# Patient Record
Sex: FEMALE | Race: WHITE | NOT HISPANIC OR LATINO
[De-identification: names, ages, dates, MRNs, and addresses within clinical notes are randomized per-mention and may not be internally consistent; named-entity substitution may affect disease eponyms.]

---

## 2017-05-01 ENCOUNTER — TRANSCRIPTION ENCOUNTER (OUTPATIENT)
Age: 37
End: 2017-05-01

## 2017-05-04 ENCOUNTER — EMERGENCY (EMERGENCY)
Facility: HOSPITAL | Age: 37
LOS: 0 days | Discharge: HOME | End: 2017-05-04
Attending: EMERGENCY MEDICINE | Admitting: EMERGENCY MEDICINE
Payer: COMMERCIAL

## 2017-05-04 VITALS — HEIGHT: 65 IN | WEIGHT: 139.99 LBS

## 2017-05-04 VITALS
TEMPERATURE: 98 F | RESPIRATION RATE: 18 BRPM | HEART RATE: 80 BPM | SYSTOLIC BLOOD PRESSURE: 111 MMHG | OXYGEN SATURATION: 97 % | DIASTOLIC BLOOD PRESSURE: 83 MMHG

## 2017-05-04 DIAGNOSIS — S09.90XA UNSPECIFIED INJURY OF HEAD, INITIAL ENCOUNTER: ICD-10-CM

## 2017-05-04 DIAGNOSIS — M79.604 PAIN IN RIGHT LEG: ICD-10-CM
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
## 2017-05-04 DIAGNOSIS — Z98.891 HISTORY OF UTERINE SCAR FROM PREVIOUS SURGERY: Chronic | ICD-10-CM
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
## 2017-05-04 PROCEDURE — 93971 EXTREMITY STUDY: CPT | Mod: 26,RT

## 2017-05-04 PROCEDURE — 99284 EMERGENCY DEPT VISIT MOD MDM: CPT | Mod: 25

## 2017-05-04 PROCEDURE — 70450 CT HEAD/BRAIN W/O DYE: CPT | Mod: 26

## 2017-05-04 RX ORDER — DIPHENHYDRAMINE HCL 50 MG
25 CAPSULE ORAL ONCE
Qty: 0 | Refills: 0 | Status: COMPLETED | OUTPATIENT
Start: 2017-05-04 | End: 2017-05-04

## 2017-05-04 RX ORDER — KETOROLAC TROMETHAMINE 30 MG/ML
30 SYRINGE (ML) INJECTION ONCE
Qty: 0 | Refills: 0 | Status: DISCONTINUED | OUTPATIENT
Start: 2017-05-04 | End: 2017-05-04

## 2017-05-04 RX ORDER — METOCLOPRAMIDE HCL 10 MG
10 TABLET ORAL ONCE
Qty: 0 | Refills: 0 | Status: COMPLETED | OUTPATIENT
Start: 2017-05-04 | End: 2017-05-04

## 2017-05-04 RX ORDER — SODIUM CHLORIDE 9 MG/ML
1000 INJECTION INTRAMUSCULAR; INTRAVENOUS; SUBCUTANEOUS ONCE
Qty: 0 | Refills: 0 | Status: COMPLETED | OUTPATIENT
Start: 2017-05-04 | End: 2017-05-04

## 2017-05-04 RX ADMIN — Medication 30 MILLIGRAM(S): at 06:35

## 2017-05-04 RX ADMIN — Medication 25 MILLIGRAM(S): at 06:35

## 2017-05-04 RX ADMIN — Medication 10 MILLIGRAM(S): at 06:35

## 2017-05-04 RX ADMIN — Medication 25 MILLIGRAM(S): at 07:00

## 2017-05-04 RX ADMIN — SODIUM CHLORIDE 1000 MILLILITER(S): 9 INJECTION INTRAMUSCULAR; INTRAVENOUS; SUBCUTANEOUS at 06:43
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
## 2017-05-04 NOTE — ED ADULT NURSE NOTE - CHPI ED SYMPTOMS NEG
no change in level of consciousness/no confusion/no dizziness/no blurred vision/no loss of consciousness

## 2017-05-04 NOTE — ED PROVIDER NOTE - DETAILS:
Delmis Woo MD - The scribe's documentation has been prepared under my direction and personally reviewed by me in its entirety. I confirm that the note above accurately reflects all work, treatment, procedures, and medical decision making performed by me.
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
## 2017-05-04 NOTE — ED PROVIDER NOTE - MUSCULOSKELETAL, MLM
Spine appears normal, range of motion is not limited, no muscle or joint tenderness Spine appears normal, range of motion is not limited, no muscle or joint tenderness.  Mild tenderness to palpation behind right knee without specific point tenderness, neg Homans sign, no swelling or color change

## 2018-05-23 ENCOUNTER — EMERGENCY (EMERGENCY)
Facility: HOSPITAL | Age: 38
LOS: 0 days | Discharge: HOME | End: 2018-05-23
Attending: EMERGENCY MEDICINE | Admitting: EMERGENCY MEDICINE
Payer: COMMERCIAL

## 2018-05-23 ENCOUNTER — TRANSCRIPTION ENCOUNTER (OUTPATIENT)
Age: 38
End: 2018-05-23

## 2018-05-23 VITALS
HEART RATE: 78 BPM | SYSTOLIC BLOOD PRESSURE: 126 MMHG | OXYGEN SATURATION: 100 % | DIASTOLIC BLOOD PRESSURE: 85 MMHG | WEIGHT: 119.93 LBS | TEMPERATURE: 98 F | HEIGHT: 65 IN | RESPIRATION RATE: 18 BRPM

## 2018-05-23 DIAGNOSIS — Z98.891 HISTORY OF UTERINE SCAR FROM PREVIOUS SURGERY: Chronic | ICD-10-CM

## 2018-05-23 LAB
ALBUMIN SERPL ELPH-MCNC: 4 G/DL — SIGNIFICANT CHANGE UP (ref 3.3–5)
ALP SERPL-CCNC: 56 U/L — SIGNIFICANT CHANGE UP (ref 40–120)
ALT FLD-CCNC: 17 U/L — SIGNIFICANT CHANGE UP (ref 12–78)
ANION GAP SERPL CALC-SCNC: 7 MMOL/L — SIGNIFICANT CHANGE UP (ref 5–17)
AST SERPL-CCNC: 11 U/L — LOW (ref 15–37)
BASOPHILS # BLD AUTO: 0.03 K/UL — SIGNIFICANT CHANGE UP (ref 0–0.2)
BASOPHILS NFR BLD AUTO: 0.5 % — SIGNIFICANT CHANGE UP (ref 0–2)
BILIRUB SERPL-MCNC: 0.4 MG/DL — SIGNIFICANT CHANGE UP (ref 0.2–1.2)
BUN SERPL-MCNC: 9 MG/DL — SIGNIFICANT CHANGE UP (ref 7–23)
CALCIUM SERPL-MCNC: 8.8 MG/DL — SIGNIFICANT CHANGE UP (ref 8.5–10.1)
CHLORIDE SERPL-SCNC: 108 MMOL/L — SIGNIFICANT CHANGE UP (ref 96–108)
CK SERPL-CCNC: 52 U/L — SIGNIFICANT CHANGE UP (ref 26–192)
CO2 SERPL-SCNC: 23 MMOL/L — SIGNIFICANT CHANGE UP (ref 22–31)
CREAT SERPL-MCNC: 0.66 MG/DL — SIGNIFICANT CHANGE UP (ref 0.5–1.3)
EOSINOPHIL # BLD AUTO: 0.01 K/UL — SIGNIFICANT CHANGE UP (ref 0–0.5)
EOSINOPHIL NFR BLD AUTO: 0.2 % — SIGNIFICANT CHANGE UP (ref 0–6)
GLUCOSE SERPL-MCNC: 83 MG/DL — SIGNIFICANT CHANGE UP (ref 70–99)
HCT VFR BLD CALC: 37.8 % — SIGNIFICANT CHANGE UP (ref 34.5–45)
HGB BLD-MCNC: 12.7 G/DL — SIGNIFICANT CHANGE UP (ref 11.5–15.5)
IMM GRANULOCYTES NFR BLD AUTO: 0.2 % — SIGNIFICANT CHANGE UP (ref 0–1.5)
LYMPHOCYTES # BLD AUTO: 1.3 K/UL — SIGNIFICANT CHANGE UP (ref 1–3.3)
LYMPHOCYTES # BLD AUTO: 22.4 % — SIGNIFICANT CHANGE UP (ref 13–44)
MCHC RBC-ENTMCNC: 29.6 PG — SIGNIFICANT CHANGE UP (ref 27–34)
MCHC RBC-ENTMCNC: 33.6 GM/DL — SIGNIFICANT CHANGE UP (ref 32–36)
MCV RBC AUTO: 88.1 FL — SIGNIFICANT CHANGE UP (ref 80–100)
MONOCYTES # BLD AUTO: 0.48 K/UL — SIGNIFICANT CHANGE UP (ref 0–0.9)
MONOCYTES NFR BLD AUTO: 8.3 % — SIGNIFICANT CHANGE UP (ref 2–14)
NEUTROPHILS # BLD AUTO: 3.97 K/UL — SIGNIFICANT CHANGE UP (ref 1.8–7.4)
NEUTROPHILS NFR BLD AUTO: 68.4 % — SIGNIFICANT CHANGE UP (ref 43–77)
NRBC # BLD: 0 /100 WBCS — SIGNIFICANT CHANGE UP (ref 0–0)
PLATELET # BLD AUTO: 246 K/UL — SIGNIFICANT CHANGE UP (ref 150–400)
POTASSIUM SERPL-MCNC: 3.6 MMOL/L — SIGNIFICANT CHANGE UP (ref 3.5–5.3)
POTASSIUM SERPL-SCNC: 3.6 MMOL/L — SIGNIFICANT CHANGE UP (ref 3.5–5.3)
PROT SERPL-MCNC: 7.7 GM/DL — SIGNIFICANT CHANGE UP (ref 6–8.3)
RBC # BLD: 4.29 M/UL — SIGNIFICANT CHANGE UP (ref 3.8–5.2)
RBC # FLD: 14.4 % — SIGNIFICANT CHANGE UP (ref 10.3–14.5)
SODIUM SERPL-SCNC: 138 MMOL/L — SIGNIFICANT CHANGE UP (ref 135–145)
TROPONIN I SERPL-MCNC: <0.015 NG/ML — SIGNIFICANT CHANGE UP (ref 0.01–0.04)
TROPONIN I SERPL-MCNC: <0.015 NG/ML — SIGNIFICANT CHANGE UP (ref 0.01–0.04)
WBC # BLD: 5.8 K/UL — SIGNIFICANT CHANGE UP (ref 3.8–10.5)
WBC # FLD AUTO: 5.8 K/UL — SIGNIFICANT CHANGE UP (ref 3.8–10.5)

## 2018-05-23 PROCEDURE — 99285 EMERGENCY DEPT VISIT HI MDM: CPT

## 2018-05-23 PROCEDURE — 83020 HEMOGLOBIN ELECTROPHORESIS: CPT | Mod: 26

## 2018-05-23 PROCEDURE — 93010 ELECTROCARDIOGRAM REPORT: CPT

## 2018-05-23 RX ORDER — ACETAMINOPHEN 500 MG
650 TABLET ORAL ONCE
Qty: 0 | Refills: 0 | Status: COMPLETED | OUTPATIENT
Start: 2018-05-23 | End: 2018-05-23

## 2018-05-23 RX ORDER — IBUPROFEN 200 MG
600 TABLET ORAL ONCE
Qty: 0 | Refills: 0 | Status: COMPLETED | OUTPATIENT
Start: 2018-05-23 | End: 2018-05-23

## 2018-05-23 RX ORDER — SODIUM CHLORIDE 9 MG/ML
3 INJECTION INTRAMUSCULAR; INTRAVENOUS; SUBCUTANEOUS ONCE
Qty: 0 | Refills: 0 | Status: DISCONTINUED | OUTPATIENT
Start: 2018-05-23 | End: 2018-05-23

## 2018-05-23 RX ADMIN — Medication 600 MILLIGRAM(S): at 15:05

## 2018-05-23 RX ADMIN — Medication 650 MILLIGRAM(S): at 18:14

## 2018-05-23 NOTE — ED STATDOCS - PROGRESS NOTE DETAILS
38 yr. old female PMH: Iron deficient Anemia presents to ED with fatigue and palpitations. Seen at Urgent Care today and sent to ED for further evaluation. No chest pain or difficulty jbreathing. Seen and examined by attending in Intake. Plan: IV, Labs, EKG and Will F/U with result  and re evaluate. Coy JAIN 38 yr. old female PMH: Iron deficient Anemia presents to ED with fatigue and palpitations. Seen at Urgent Care today and sent to ED for further evaluation. No chest pain or difficulty breathing. Seen and examined by attending in Intake. Plan: IV, Labs, EKG and Will F/U with result  and re evaluate. Coy NP

## 2018-05-23 NOTE — ED ADULT TRIAGE NOTE - CHIEF COMPLAINT QUOTE
Pt reports hx of anemia with c/o fatigue and weakness. Reports episode of palpitations at MD office with no c/o palpitations at this time. Pt reports hx of anemia with c/o fatigue and weakness. Reports episode of palpitations at MD office with no c/o palpitations at this time. Denies any pain at this time.

## 2018-05-23 NOTE — ED ADULT NURSE NOTE - CHIEF COMPLAINT QUOTE
Pt reports hx of anemia with c/o fatigue and weakness. Reports episode of palpitations at MD office with no c/o palpitations at this time.

## 2018-05-23 NOTE — ED STATDOCS - OBJECTIVE STATEMENT
39 y/o F with PMHx of iron-deficiency anemia diagnosed a few years ago presents to the ED c/o fatigue and palpitations. Pt states that she started feeling palpitations. Pt started to feel weak. Pt went to an urgent care and had an EKG done which was abnormal. Pt was then told to come to the ED for evaluation. Palpitations have resolved. Pt has some chest tightness at this time. Pt has been taking iron supplements. Pt had blood work done a month ago with Hb of 11 and ferritin of 3. LNMP 3 weeks ago. Pt states that she has heavier menstrual periods after her . PSHx of  a few years. FMHx of cardiac issues in Pt's mother after the age of 50. Non smoker. No recent travel. Dr. Kohler, hematologist.   Pt also c/o R breast and underarm tenderness starting two weeks ago. No chance of pregnancy.

## 2018-05-24 DIAGNOSIS — R00.2 PALPITATIONS: ICD-10-CM

## 2018-05-24 DIAGNOSIS — R53.83 OTHER FATIGUE: ICD-10-CM

## 2018-05-24 DIAGNOSIS — D64.9 ANEMIA, UNSPECIFIED: ICD-10-CM

## 2018-05-25 ENCOUNTER — NON-APPOINTMENT (OUTPATIENT)
Age: 38
End: 2018-05-25

## 2018-05-25 ENCOUNTER — APPOINTMENT (OUTPATIENT)
Dept: ELECTROPHYSIOLOGY | Facility: CLINIC | Age: 38
End: 2018-05-25
Payer: COMMERCIAL

## 2018-05-25 VITALS
SYSTOLIC BLOOD PRESSURE: 128 MMHG | RESPIRATION RATE: 100 BRPM | WEIGHT: 144 LBS | HEIGHT: 65 IN | HEART RATE: 81 BPM | DIASTOLIC BLOOD PRESSURE: 80 MMHG | BODY MASS INDEX: 23.99 KG/M2

## 2018-05-25 DIAGNOSIS — Z78.9 OTHER SPECIFIED HEALTH STATUS: ICD-10-CM

## 2018-05-25 DIAGNOSIS — Z82.49 FAMILY HISTORY OF ISCHEMIC HEART DISEASE AND OTHER DISEASES OF THE CIRCULATORY SYSTEM: ICD-10-CM

## 2018-05-25 DIAGNOSIS — Z83.49 FAMILY HISTORY OF OTHER ENDOCRINE, NUTRITIONAL AND METABOLIC DISEASES: ICD-10-CM

## 2018-05-25 PROCEDURE — 93000 ELECTROCARDIOGRAM COMPLETE: CPT

## 2018-05-25 PROCEDURE — 99205 OFFICE O/P NEW HI 60 MIN: CPT

## 2018-05-25 PROCEDURE — 99214 OFFICE O/P EST MOD 30 MIN: CPT

## 2018-05-25 RX ORDER — FERROUS SULFATE 325(65) MG
325 (65 FE) TABLET ORAL
Refills: 0 | Status: ACTIVE | COMMUNITY

## 2018-05-26 LAB
HEMOGLOBIN INTERPRETATION: SIGNIFICANT CHANGE UP
HGB A MFR BLD: 97.5 % — SIGNIFICANT CHANGE UP (ref 95.8–98)
HGB A2 MFR BLD: 2.5 % — SIGNIFICANT CHANGE UP (ref 2–3.2)

## 2018-06-22 ENCOUNTER — APPOINTMENT (OUTPATIENT)
Dept: ELECTROPHYSIOLOGY | Facility: CLINIC | Age: 38
End: 2018-06-22
Payer: COMMERCIAL

## 2018-06-22 VITALS
BODY MASS INDEX: 23.14 KG/M2 | HEART RATE: 71 BPM | SYSTOLIC BLOOD PRESSURE: 112 MMHG | HEIGHT: 66 IN | DIASTOLIC BLOOD PRESSURE: 75 MMHG | WEIGHT: 144 LBS

## 2018-06-22 DIAGNOSIS — R00.2 PALPITATIONS: ICD-10-CM

## 2018-06-22 DIAGNOSIS — R06.09 OTHER FORMS OF DYSPNEA: ICD-10-CM

## 2018-06-22 PROCEDURE — 99214 OFFICE O/P EST MOD 30 MIN: CPT

## 2018-06-22 RX ORDER — ALUMINUM HYDROXIDE AND MAGNESIUM HYDROXIDE 200; 200 MG/5ML; MG/5ML
200-200 SUSPENSION ORAL
Refills: 0 | Status: ACTIVE | COMMUNITY

## 2018-06-28 ENCOUNTER — APPOINTMENT (OUTPATIENT)
Dept: ELECTROPHYSIOLOGY | Facility: CLINIC | Age: 38
End: 2018-06-28

## 2019-02-28 ENCOUNTER — TRANSCRIPTION ENCOUNTER (OUTPATIENT)
Age: 39
End: 2019-02-28

## 2020-01-26 ENCOUNTER — TRANSCRIPTION ENCOUNTER (OUTPATIENT)
Age: 40
End: 2020-01-26

## 2020-10-30 NOTE — ED PROVIDER NOTE - NS ED MD DISPO DISCHARGE
I have personally seen and examined this patient.  I have fully participated in the care of this patient. I have reviewed all pertinent clinical information, including history, physical exam, plan and the Resident’s note and agree except as noted. Home

## 2023-01-19 NOTE — ED PROVIDER NOTE - NS ED MD SCRIBE ATTENDING SCRIBE SECTIONS
PHYSICAL EXAM/REVIEW OF SYSTEMS/PAST MEDICAL/SURGICAL/SOCIAL HISTORY/HISTORY OF PRESENT ILLNESS/RESULTS/PROGRESS NOTE/DISPOSITION/VITAL SIGNS( Pullset) 14

## 2023-05-23 NOTE — ED ADULT NURSE NOTE - NS ED NOTE ABUSE RESPONSE YN
no
woke up at 7am feeling fine, brushed teeth,, was about to get dressed , lost balance , fell onto the dresser, sudden onset of dizziness and nausea, denies any h/o vertigo. fingerstick- 100

## 2023-08-31 ENCOUNTER — OFFICE (OUTPATIENT)
Dept: URBAN - METROPOLITAN AREA CLINIC 100 | Facility: CLINIC | Age: 43
Setting detail: OPHTHALMOLOGY
End: 2023-08-31
Payer: COMMERCIAL

## 2023-08-31 DIAGNOSIS — T15.12XA: ICD-10-CM

## 2023-08-31 PROBLEM — H40.013 GLAUCOMA SUSPECT, LOW RISK 1-2 FACTORS; BOTH EYES: Status: ACTIVE | Noted: 2023-08-31

## 2023-08-31 PROCEDURE — 92002 INTRM OPH EXAM NEW PATIENT: CPT | Performed by: OPHTHALMOLOGY

## 2023-08-31 ASSESSMENT — SPHEQUIV_DERIVED
OD_SPHEQUIV: -1.5
OS_SPHEQUIV: -1.25

## 2023-08-31 ASSESSMENT — CONFRONTATIONAL VISUAL FIELD TEST (CVF)
OS_FINDINGS: FULL
OD_FINDINGS: FULL

## 2023-08-31 ASSESSMENT — VISUAL ACUITY
OD_BCVA: 20/20
OS_BCVA: 20/20-2

## 2023-08-31 ASSESSMENT — REFRACTION_AUTOREFRACTION
OD_SPHERE: -1.00
OS_SPHERE: -1.00
OS_CYLINDER: -0.50
OD_CYLINDER: -1.00
OS_AXIS: 076
OD_AXIS: 104

## 2023-08-31 ASSESSMENT — TONOMETRY
OS_IOP_MMHG: 20
OD_IOP_MMHG: 20

## 2023-09-14 ENCOUNTER — OFFICE (OUTPATIENT)
Dept: URBAN - METROPOLITAN AREA CLINIC 100 | Facility: CLINIC | Age: 43
Setting detail: OPHTHALMOLOGY
End: 2023-09-14
Payer: COMMERCIAL

## 2023-09-14 DIAGNOSIS — H40.013: ICD-10-CM

## 2023-09-14 PROCEDURE — 76514 ECHO EXAM OF EYE THICKNESS: CPT | Performed by: OPHTHALMOLOGY

## 2023-09-14 PROCEDURE — 92083 EXTENDED VISUAL FIELD XM: CPT | Performed by: OPHTHALMOLOGY

## 2023-09-14 PROCEDURE — 92250 FUNDUS PHOTOGRAPHY W/I&R: CPT | Performed by: OPHTHALMOLOGY

## 2023-09-14 PROCEDURE — 92014 COMPRE OPH EXAM EST PT 1/>: CPT | Performed by: OPHTHALMOLOGY

## 2023-09-14 ASSESSMENT — KERATOMETRY
OD_K1POWER_DIOPTERS: 43.00
OS_AXISANGLE_DEGREES: 125
OD_K2POWER_DIOPTERS: 43.00
OS_K1POWER_DIOPTERS: 43.00
OS_K2POWER_DIOPTERS: 43.25
OD_AXISANGLE_DEGREES: 090

## 2023-09-14 ASSESSMENT — TONOMETRY
OD_IOP_MMHG: 18
OS_IOP_MMHG: 18

## 2023-09-14 ASSESSMENT — SPHEQUIV_DERIVED
OS_SPHEQUIV: -1.75
OD_SPHEQUIV: -1.375
OD_SPHEQUIV: -2.125
OS_SPHEQUIV: -1.25

## 2023-09-14 ASSESSMENT — PACHYMETRY
OD_CT_UM: 555
OS_CT_UM: 561
OS_CT_CORRECTION: -1
OD_CT_CORRECTION: -1

## 2023-09-14 ASSESSMENT — AXIALLENGTH_DERIVED
OS_AL: 24.4409
OD_AL: 24.3339
OS_AL: 24.2336
OD_AL: 24.6488

## 2023-09-14 ASSESSMENT — REFRACTION_MANIFEST
OD_ADD: +1.50
OD_SPHERE: -1.75
OS_VA1: 20/20
OS_AXIS: 080
OD_AXIS: 110
OS_ADD: +1.50
OS_SPHERE: -1.50
OD_VA1: 20/20
OS_CYLINDER: -0.50
OD_CYLINDER: -0.75

## 2023-09-14 ASSESSMENT — REFRACTION_AUTOREFRACTION
OD_AXIS: 109
OS_CYLINDER: -0.50
OS_SPHERE: -1.00
OD_CYLINDER: -0.75
OS_AXIS: 078
OD_SPHERE: -1.00

## 2023-09-14 ASSESSMENT — REFRACTION_CURRENTRX
OS_OVR_VA: 20/
OD_CYLINDER: SPHR
OS_SPHERE: -1.25
OD_SPHERE: -1.50
OD_VPRISM_DIRECTION: SV
OS_VPRISM_DIRECTION: SV
OD_OVR_VA: 20/
OS_CYLINDER: SPHR

## 2023-09-14 ASSESSMENT — CONFRONTATIONAL VISUAL FIELD TEST (CVF)
OD_FINDINGS: FULL
OS_FINDINGS: FULL

## 2023-09-14 ASSESSMENT — VISUAL ACUITY
OD_BCVA: 20/20
OS_BCVA: 20/20

## 2025-01-16 ENCOUNTER — OFFICE (OUTPATIENT)
Dept: URBAN - METROPOLITAN AREA CLINIC 100 | Facility: CLINIC | Age: 45
Setting detail: OPHTHALMOLOGY
End: 2025-01-16
Payer: COMMERCIAL

## 2025-01-16 PROCEDURE — SCCOS COSMETIC CONSULTATION: Performed by: OPHTHALMOLOGY

## 2025-01-16 ASSESSMENT — VISUAL ACUITY
OD_BCVA: 20/40
OS_BCVA: 20/50-2

## 2025-01-16 ASSESSMENT — REFRACTION_AUTOREFRACTION
OS_AXIS: 078
OD_CYLINDER: -0.75
OS_SPHERE: -1.00
OD_AXIS: 109
OD_SPHERE: -1.00
OS_CYLINDER: -0.50

## 2025-01-16 ASSESSMENT — KERATOMETRY
OS_AXISANGLE_DEGREES: 125
OD_K2POWER_DIOPTERS: 43.00
OS_K1POWER_DIOPTERS: 43.00
OD_K1POWER_DIOPTERS: 43.00
OS_K2POWER_DIOPTERS: 43.25
OD_AXISANGLE_DEGREES: 090

## 2025-01-16 ASSESSMENT — CONFRONTATIONAL VISUAL FIELD TEST (CVF)
OD_FINDINGS: FULL
OS_FINDINGS: FULL

## 2025-06-02 ENCOUNTER — OFFICE (OUTPATIENT)
Dept: URBAN - METROPOLITAN AREA CLINIC 12 | Facility: CLINIC | Age: 45
Setting detail: OPHTHALMOLOGY
End: 2025-06-02
Payer: COMMERCIAL

## 2025-06-02 DIAGNOSIS — S05.02XA: ICD-10-CM

## 2025-06-02 PROCEDURE — 92014 COMPRE OPH EXAM EST PT 1/>: CPT | Performed by: OPTOMETRIST

## 2025-06-02 ASSESSMENT — REFRACTION_AUTOREFRACTION
OS_SPHERE: -1.00
OD_AXIS: 109
OD_CYLINDER: -0.75
OS_CYLINDER: -0.50
OS_AXIS: 078
OD_SPHERE: -1.00

## 2025-06-02 ASSESSMENT — PACHYMETRY
OD_CT_CORRECTION: -1
OS_CT_CORRECTION: -1
OS_CT_UM: 561
OD_CT_UM: 555

## 2025-06-02 ASSESSMENT — VISUAL ACUITY
OD_BCVA: 20/20-1
OS_BCVA: 20/20-2

## 2025-06-02 ASSESSMENT — KERATOMETRY
OD_K1POWER_DIOPTERS: 43.00
OS_K2POWER_DIOPTERS: 43.25
OS_AXISANGLE_DEGREES: 125
OD_AXISANGLE_DEGREES: 090
OS_K1POWER_DIOPTERS: 43.00
OD_K2POWER_DIOPTERS: 43.00

## 2025-06-02 ASSESSMENT — CORNEAL TRAUMA - ABRASION: OS_ABRASION: PRESENT

## 2025-06-02 ASSESSMENT — TONOMETRY
OS_IOP_MMHG: 17
OD_IOP_MMHG: 16

## 2025-06-02 ASSESSMENT — CONFRONTATIONAL VISUAL FIELD TEST (CVF)
OS_FINDINGS: FULL
OD_FINDINGS: FULL

## 2025-06-21 ENCOUNTER — OFFICE (OUTPATIENT)
Dept: URBAN - METROPOLITAN AREA CLINIC 12 | Facility: CLINIC | Age: 45
Setting detail: OPHTHALMOLOGY
End: 2025-06-21
Payer: COMMERCIAL

## 2025-06-21 DIAGNOSIS — H16.223: ICD-10-CM

## 2025-06-21 PROBLEM — S05.02XA CORNEAL ABRASION; INITIAL ENCOUNTER: Status: RESOLVED | Noted: 2025-06-02 | Resolved: 2025-06-21

## 2025-06-21 PROBLEM — G51.4 MYOKIYMIA: Status: ACTIVE | Noted: 2025-06-21

## 2025-06-21 PROCEDURE — 92012 INTRM OPH EXAM EST PATIENT: CPT | Performed by: OPTOMETRIST

## 2025-06-21 ASSESSMENT — SUPERFICIAL PUNCTATE KERATITIS (SPK)
OS_SPK: 1+
OD_SPK: 1+

## 2025-06-21 ASSESSMENT — PACHYMETRY
OS_CT_CORRECTION: -1
OS_CT_UM: 561
OD_CT_UM: 555
OD_CT_CORRECTION: -1

## 2025-06-21 ASSESSMENT — TONOMETRY
OS_IOP_MMHG: 21
OD_IOP_MMHG: 21

## 2025-06-21 ASSESSMENT — KERATOMETRY
OD_K1POWER_DIOPTERS: 43.00
OD_K2POWER_DIOPTERS: 43.00
OS_AXISANGLE_DEGREES: 101
OS_K1POWER_DIOPTERS: 43.00
OS_K2POWER_DIOPTERS: 43.25
OD_AXISANGLE_DEGREES: 090

## 2025-06-21 ASSESSMENT — VISUAL ACUITY
OS_BCVA: 20/20
OD_BCVA: 20/20

## 2025-06-21 ASSESSMENT — REFRACTION_AUTOREFRACTION
OS_AXIS: 070
OS_SPHERE: -1.00
OS_CYLINDER: -0.25
OD_CYLINDER: -0.75
OD_AXIS: 099
OD_SPHERE: -1.00

## 2025-06-21 ASSESSMENT — CORNEAL TRAUMA - ABRASION: OS_ABRASION: PRESENT

## 2025-07-03 ENCOUNTER — OFFICE (OUTPATIENT)
Dept: URBAN - METROPOLITAN AREA CLINIC 100 | Facility: CLINIC | Age: 45
Setting detail: OPHTHALMOLOGY
End: 2025-07-03
Payer: COMMERCIAL

## 2025-07-03 DIAGNOSIS — H16.223: ICD-10-CM

## 2025-07-03 DIAGNOSIS — H52.4: ICD-10-CM

## 2025-07-03 DIAGNOSIS — H40.013: ICD-10-CM

## 2025-07-03 DIAGNOSIS — G24.5: ICD-10-CM

## 2025-07-03 PROCEDURE — 92083 EXTENDED VISUAL FIELD XM: CPT | Performed by: OPHTHALMOLOGY

## 2025-07-03 PROCEDURE — 92014 COMPRE OPH EXAM EST PT 1/>: CPT | Performed by: OPHTHALMOLOGY

## 2025-07-03 PROCEDURE — 92015 DETERMINE REFRACTIVE STATE: CPT | Performed by: OPHTHALMOLOGY

## 2025-07-03 PROCEDURE — 92133 CPTRZD OPH DX IMG PST SGM ON: CPT | Performed by: OPHTHALMOLOGY

## 2025-07-03 ASSESSMENT — REFRACTION_MANIFEST
OD_AXIS: 095
OS_SPHERE: -1.25
OD_SPHERE: -1.00
OS_AXIS: 090
OS_AXIS: 090
OD_SPHERE: -1.00
OD_VA1: 20/20
OS_CYLINDER: -0.25
OS_ADD: +1.75
OS_CYLINDER: -0.25
OD_CYLINDER: -1.00
OD_CYLINDER: -1.00
OS_VA1: 20/20
OD_VA1: 20/20
OS_VA1: 20/20
OD_AXIS: 095
OS_SPHERE: -1.25
OD_ADD: +1.75

## 2025-07-03 ASSESSMENT — VISUAL ACUITY
OD_BCVA: 20/20
OS_BCVA: 20/20

## 2025-07-03 ASSESSMENT — CONFRONTATIONAL VISUAL FIELD TEST (CVF)
OD_FINDINGS: FULL
OS_FINDINGS: FULL

## 2025-07-03 ASSESSMENT — KERATOMETRY
OD_K2POWER_DIOPTERS: 43.00
OS_K1POWER_DIOPTERS: 43.00
OD_K1POWER_DIOPTERS: 42.75
OS_AXISANGLE_DEGREES: 090
OD_AXISANGLE_DEGREES: 125
OS_K2POWER_DIOPTERS: 43.00

## 2025-07-03 ASSESSMENT — PACHYMETRY
OS_CT_CORRECTION: -1
OD_CT_UM: 555
OD_CT_CORRECTION: -1
OS_CT_UM: 561

## 2025-07-03 ASSESSMENT — REFRACTION_AUTOREFRACTION
OS_SPHERE: -1.00
OS_AXIS: 090
OD_SPHERE: -0.75
OS_CYLINDER: -0.25
OD_AXIS: 096
OD_CYLINDER: -1.00

## 2025-07-03 ASSESSMENT — SUPERFICIAL PUNCTATE KERATITIS (SPK)
OS_SPK: 1+
OD_SPK: 1+

## 2025-07-03 ASSESSMENT — REFRACTION_CURRENTRX
OD_OVR_VA: 20/
OS_OVR_VA: 20/

## 2025-07-03 ASSESSMENT — TONOMETRY
OD_IOP_MMHG: 21
OS_IOP_MMHG: 21

## 2025-07-03 ASSESSMENT — CORNEAL TRAUMA - ABRASION: OS_ABRASION: PRESENT
